# Patient Record
Sex: FEMALE | Race: WHITE | NOT HISPANIC OR LATINO | Employment: UNEMPLOYED | URBAN - METROPOLITAN AREA
[De-identification: names, ages, dates, MRNs, and addresses within clinical notes are randomized per-mention and may not be internally consistent; named-entity substitution may affect disease eponyms.]

---

## 2017-04-21 ENCOUNTER — HOSPITAL ENCOUNTER (OUTPATIENT)
Dept: NEUROLOGY | Facility: HOSPITAL | Age: 32
Discharge: HOME | End: 2017-04-21
Attending: PSYCHIATRY & NEUROLOGY
Payer: MEDICAID

## 2017-04-21 DIAGNOSIS — G40.319 GENERALIZED CONVULSIVE EPILEPSY WITH INTRACTABLE EPILEPSY: ICD-10-CM

## 2017-04-21 DIAGNOSIS — R56.9 CONVULSION DISORDER: ICD-10-CM

## 2017-04-21 PROCEDURE — 95816 EEG AWAKE AND DROWSY: CPT

## 2017-04-21 PROCEDURE — 95819 EEG AWAKE AND ASLEEP: CPT | Mod: 26,,, | Performed by: PSYCHIATRY & NEUROLOGY

## 2017-04-24 NOTE — PROCEDURES
DATE OF SERVICE:  04/21/2017    EEG NUMBER:  [id number].    REQUESTING PHYSICIAN:  Aga Atkinson M.D.    ELECTROENCEPHALOGRAM REPORT    METHODOLOGY:  Electroencephalographic (EEG) recording is recorded with   electrodes placed according to the International 10-20 placement system.  Thirty   two (32) channels of digital signal (sampling rate of 512/sec), including T1   and T2, were simultaneously recorded from the scalp and may include EKG, EMG,   and/or eye monitors.  Recording band pass was 0.1 to 512 Hz.  Digital video   recording of the patient is simultaneously recorded with the EEG.  The patient   is instructed to report clinical symptoms which may occur during the recording   session.  EEG and video recording are stored and archived in digital format.    Activation procedures, which include photic stimulation, hyperventilation and   instructing patients to perform simple tasks, are done in selected patients.    The EEG is displayed on a monitor screen and can be reviewed using different   montages.  Computer assisted-analysis is employed to detect spike and   electrographic seizure activity.  The entire record is submitted for computer   analysis.  The entire recording is visually reviewed, and the times identified   by computer analysis as being spikes or seizures are reviewed again.    Compressed spectral analysis (CSA) is also performed on the activity recorded   from each individual channel.  This is displayed as a power display of   frequencies from 0 to 30 Hz over time.  The CSA is reviewed looking for   asymmetries in power between homologous areas of the scalp, then compared with   the original EEG recording.    [employer] software was also utilized in the review of this study.  This software   suite analyzes the EEG recording in multiple domains.  Coherence and rhythmicity   are computed to identify EEG sections which may contain organized seizures.    Each channel undergoes analysis to detect the  presence of spike and sharp waves   which have special and morphological characteristics of epileptic activity.  The   routine EEG recording is converted from special into frequency domain.  This is   then displayed comparing homologous areas to identify areas of significant   asymmetry.  Algorithm to identify non-cortically generated artifact is used to   separate artifact from the EEG.    EEG FINDINGS:  Recording was obtained with the patient initially in a waking   state.  Background was a fairly rhythmic 11 Hz posterior dominant rhythm seen in   the occipital, parietal, and posterior temporal regions bilaterally.  There was   a prominent irregular beta activity seen diffusely.  The posterior dominant   rhythm attenuated intermittently and the patient appeared to become a little   drowsy.  A generalized low-voltage theta and beta mixture was noted within light   sleep for a short period of time.  No lateralized or focal changes were noted.    No spike or sharp wave activity was seen.  Intermittent photic stimulation was   carried out, which resulted in the occipital driving response without provoking   pathological discharges.  Three minutes of hyperventilation resulted in some   mild disorganization and slowing without provoking pathological activity.  The   slowing normalized quickly after overbreathing ceased.    IMPRESSION:  Normal waking and sleeping EEG.    CLINICAL CORRELATION:  The patient is a 31-year-old female who has a history of   3 seizures total.  First was 5 years ago and the most recently was in December 2016.  All have occurred with the discontinuation of medication.  This tracing   presently is normal and shows no evidence for an epileptic process.      LYNSEY/  dd: 04/24/2017 14:12:55 (CDT)  td: 04/24/2017 14:41:20 (CDT)  Doc ID   #[id number]  Job ID #[id number]    CC:

## 2017-05-27 ENCOUNTER — HOSPITAL ENCOUNTER (EMERGENCY)
Facility: HOSPITAL | Age: 32
Discharge: HOME | End: 2017-05-27
Attending: EMERGENCY MEDICINE
Payer: MEDICAID

## 2017-05-27 VITALS
HEIGHT: 63 IN | OXYGEN SATURATION: 96 % | TEMPERATURE: 99 F | WEIGHT: 170 LBS | HEART RATE: 103 BPM | DIASTOLIC BLOOD PRESSURE: 90 MMHG | SYSTOLIC BLOOD PRESSURE: 145 MMHG | BODY MASS INDEX: 30.12 KG/M2 | RESPIRATION RATE: 20 BRPM

## 2017-05-27 DIAGNOSIS — N75.0 BARTHOLIN'S CYST: Primary | ICD-10-CM

## 2017-05-27 PROCEDURE — 56420 I&D BARTHOLINS GLAND ABSCESS: CPT

## 2017-05-27 PROCEDURE — 25000003 PHARM REV CODE 250: Performed by: NURSE PRACTITIONER

## 2017-05-27 PROCEDURE — 99283 EMERGENCY DEPT VISIT LOW MDM: CPT | Mod: 25

## 2017-05-27 RX ORDER — LIDOCAINE HYDROCHLORIDE 10 MG/ML
5 INJECTION, SOLUTION EPIDURAL; INFILTRATION; INTRACAUDAL; PERINEURAL
Status: COMPLETED | OUTPATIENT
Start: 2017-05-27 | End: 2017-05-27

## 2017-05-27 RX ORDER — HYDROCODONE BITARTRATE AND ACETAMINOPHEN 5; 325 MG/1; MG/1
2 TABLET ORAL
Status: COMPLETED | OUTPATIENT
Start: 2017-05-27 | End: 2017-05-27

## 2017-05-27 RX ORDER — SULFAMETHOXAZOLE AND TRIMETHOPRIM 800; 160 MG/1; MG/1
1 TABLET ORAL 2 TIMES DAILY
Qty: 20 TABLET | Refills: 0 | Status: SHIPPED | OUTPATIENT
Start: 2017-05-27 | End: 2017-06-06

## 2017-05-27 RX ORDER — TRAMADOL HYDROCHLORIDE 50 MG/1
50 TABLET ORAL EVERY 6 HOURS PRN
Qty: 12 TABLET | Refills: 0 | Status: SHIPPED | OUTPATIENT
Start: 2017-05-27 | End: 2017-06-06

## 2017-05-27 RX ORDER — ESCITALOPRAM OXALATE 20 MG/1
20 TABLET ORAL DAILY
COMMUNITY

## 2017-05-27 RX ADMIN — LIDOCAINE HYDROCHLORIDE 50 MG: 10 INJECTION, SOLUTION EPIDURAL; INFILTRATION; INTRACAUDAL; PERINEURAL at 05:05

## 2017-05-27 RX ADMIN — HYDROCODONE BITARTRATE AND ACETAMINOPHEN 2 TABLET: 5; 325 TABLET ORAL at 05:05

## 2017-05-27 NOTE — ED PROVIDER NOTES
Encounter Date: 5/27/2017       History     Chief Complaint   Patient presents with    Bartholin's Cyst     no drainage, painful     Review of patient's allergies indicates:   Allergen Reactions    Penicillins Rash     PT REPORTS HAVING REACTION AS A CHILD     PT present to Ed for pain and swelling to vaginal area. PT reports she noted worsening over past few days. PT denies vaginal bleeding or dicharge. PT states she has had in past but usually resolved on its own. PT denies fever. Pt denies dysuria.           Past Medical History:   Diagnosis Date    Seizures      Past Surgical History:   Procedure Laterality Date    TONSILLECTOMY      TUBAL LIGATION      left fallopian removed     History reviewed. No pertinent family history.  Social History   Substance Use Topics    Smoking status: Current Some Day Smoker     Packs/day: 0.50     Types: Cigarettes    Smokeless tobacco: Not on file    Alcohol use Yes      Comment: OCCASIONALLY     Review of Systems   Constitutional: Negative.    Respiratory: Negative.    Cardiovascular: Negative.    Gastrointestinal: Negative.    Genitourinary: Positive for vaginal pain.        Swelling to R labia area   Musculoskeletal: Negative.    Neurological: Negative.    Hematological: Negative.    All other systems reviewed and are negative.      Physical Exam     Initial Vitals [05/27/17 1530]   BP Pulse Resp Temp SpO2   (!) 145/90 103 20 98.8 °F (37.1 °C) 96 %     Physical Exam    Nursing note and vitals reviewed.  Constitutional: She appears well-developed and well-nourished.   Neck: Normal range of motion.   Cardiovascular: Normal rate, regular rhythm, normal heart sounds and intact distal pulses.   Pulmonary/Chest: Breath sounds normal.   Abdominal: Soft. Bowel sounds are normal.   Genitourinary:   Genitourinary Comments: Bartholin cyst to R labia, tender on exam.    Musculoskeletal: Normal range of motion.   Neurological: She is alert and oriented to person, place, and time.    Skin: Skin is warm and dry.   Psychiatric: She has a normal mood and affect.         ED Course   I & D - Incision and Drainage  Date/Time: 5/27/2017 5:39 PM  Location procedure was performed: [facility] EMERGENCY DEPARTMENT  Performed by: ALICIA MEJÍA  Authorized by: JARAD RILEY JR   Assisting provider: JARAD RILEY JR  Pre-operative diagnosis: Abscess R labia  Post-operative diagnosis: Abscess R labia  Consent Done: Not Needed  Type: abscess  Body area: anogenital  Location details: Bartholin's gland  Anesthesia: local infiltration    Anesthesia:  Anesthesia: local infiltration  Local Anesthetic: lidocaine 1% without epinephrine   Anesthetic total: 3 mL  Patient sedated: no  Description of findings: R tenderness and swelling to R labia    Scalpel size: 10  Incision type: single straight  Complexity: simple  Drainage: bloody  Drainage amount: scant  Wound treatment: incision,  drainage and  wound packed  Packing material: 1/4 in gauze  Technical procedures used: incisin and drainaGE  Significant surgical tasks conducted by the assistant(s): none  Complications: No  Estimated blood loss (mL): 2  Specimens: No  Implants: No  Patient tolerance: Patient tolerated the procedure well with no immediate complications        Labs Reviewed - No data to display          Medical Decision Making:   Initial Assessment:   R labial abscess  Differential Diagnosis:   Abscess, Bartholin cyst,   ED Management:  R labia abscees incised and drained. Packed. Will follow up with Dr Childress on Tuesdat. Return to Ed for worsening. Bactrim and Tramadol for pain                    ED Course     Clinical Impression:   The encounter diagnosis was Bartholin's cyst.    Disposition:   Disposition: Discharged  Condition: Stable       Alicia Mejía NP  05/27/17 1748

## 2017-05-27 NOTE — ED NOTES
31 year old female presents to ed with chief complaint of bartholin's cyst that began two days ago. Patient states hx of bartholin cyst. Denies drainage states home remedies i.e. Warm bathes and balms used. Patient denies vaginal discharge/ bledding

## 2017-05-28 ENCOUNTER — HOSPITAL ENCOUNTER (EMERGENCY)
Facility: HOSPITAL | Age: 32
Discharge: HOME | End: 2017-05-28
Attending: EMERGENCY MEDICINE
Payer: MEDICAID

## 2017-05-28 VITALS
DIASTOLIC BLOOD PRESSURE: 81 MMHG | HEART RATE: 103 BPM | HEIGHT: 63 IN | RESPIRATION RATE: 20 BRPM | WEIGHT: 170 LBS | TEMPERATURE: 99 F | SYSTOLIC BLOOD PRESSURE: 138 MMHG | BODY MASS INDEX: 30.12 KG/M2 | OXYGEN SATURATION: 98 %

## 2017-05-28 DIAGNOSIS — N75.0 BARTHOLIN CYST: ICD-10-CM

## 2017-05-28 DIAGNOSIS — Z51.89 WOUND CHECK, ABSCESS: Primary | ICD-10-CM

## 2017-05-28 PROCEDURE — 99283 EMERGENCY DEPT VISIT LOW MDM: CPT | Mod: 25

## 2017-05-28 PROCEDURE — 25000003 PHARM REV CODE 250: Performed by: PHYSICIAN ASSISTANT

## 2017-05-28 PROCEDURE — 96372 THER/PROPH/DIAG INJ SC/IM: CPT

## 2017-05-28 PROCEDURE — 63600175 PHARM REV CODE 636 W HCPCS: Performed by: PHYSICIAN ASSISTANT

## 2017-05-28 RX ORDER — HYDROMORPHONE HYDROCHLORIDE 1 MG/ML
0.5 INJECTION, SOLUTION INTRAMUSCULAR; INTRAVENOUS; SUBCUTANEOUS
Status: COMPLETED | OUTPATIENT
Start: 2017-05-28 | End: 2017-05-28

## 2017-05-28 RX ORDER — OXYCODONE AND ACETAMINOPHEN 5; 325 MG/1; MG/1
1 TABLET ORAL EVERY 4 HOURS PRN
Qty: 16 TABLET | Refills: 0 | Status: SHIPPED | OUTPATIENT
Start: 2017-05-28

## 2017-05-28 RX ORDER — ONDANSETRON 4 MG/1
4 TABLET, ORALLY DISINTEGRATING ORAL
Status: COMPLETED | OUTPATIENT
Start: 2017-05-28 | End: 2017-05-28

## 2017-05-28 RX ADMIN — ONDANSETRON 4 MG: 4 TABLET, ORALLY DISINTEGRATING ORAL at 12:05

## 2017-05-28 RX ADMIN — HYDROMORPHONE HYDROCHLORIDE: 1 INJECTION, SOLUTION INTRAMUSCULAR; INTRAVENOUS; SUBCUTANEOUS at 12:05

## 2017-05-28 NOTE — ED NOTES
Bartholin cyst x 3 days, was seen in ED last pm with I & D to area performed.  PT states pain/swelling to area has increased since procedure and pain medication has been ineffective.  Denies fever.  Last dose of tramadol was 6 am.

## 2017-05-28 NOTE — ED PROVIDER NOTES
Encounter Date: 5/28/2017       History     Chief Complaint   Patient presents with    Bartholin's Cyst     was seen here yesterday and had a I&D; cyst is larger and more painful today with no drainage     Review of patient's allergies indicates:   Allergen Reactions    Penicillins Rash     PT REPORTS HAVING REACTION AS A CHILD     Rosalind Willis, a 31 y.o. female with PMH of convulsion disorder that presents to the ED for recheck of her Bartholin cyst that was I&D'ed yesterday at this facility.  She has experienced increased pain and swelling to this area but no discharge.  Only gauze was placed at the site with no packing or word catheter.  She has started her ABX and has taken medication for pain, but states that she's gotten very little pain relief with what was prescribed.  She denies any fever, N/V.          The history is provided by the patient.     Past Medical History:   Diagnosis Date    Bartholin cyst     Seizures      Past Surgical History:   Procedure Laterality Date    TONSILLECTOMY      TUBAL LIGATION      left fallopian removed     History reviewed. No pertinent family history.  Social History   Substance Use Topics    Smoking status: Current Some Day Smoker     Packs/day: 0.50     Types: Cigarettes    Smokeless tobacco: Not on file    Alcohol use Yes      Comment: OCCASIONALLY     Review of Systems   Constitutional: Negative for fever.   Respiratory: Negative for shortness of breath.    Gastrointestinal: Negative for nausea and vomiting.   Genitourinary: Positive for vaginal pain (to R labia ). Negative for vaginal bleeding and vaginal discharge.   Skin: Positive for wound.   Allergic/Immunologic: Negative for immunocompromised state.   Neurological: Negative for light-headedness.   Hematological: Does not bruise/bleed easily.   Psychiatric/Behavioral: Negative for agitation.   All other systems reviewed and are negative.      Physical Exam     Initial Vitals [05/28/17 1203]   BP Pulse  Resp Temp SpO2   (!) 144/79 98 20 98.3 °F (36.8 °C) 98 %     Physical Exam    Nursing note and vitals reviewed.  Constitutional: She appears well-developed and well-nourished. No distress.   HENT:   Head: Normocephalic and atraumatic.   Right Ear: External ear normal.   Left Ear: External ear normal.   Nose: Nose normal.   Mouth/Throat: Oropharynx is clear and moist.   Eyes: Conjunctivae and EOM are normal.   Neck: Normal range of motion.   Cardiovascular: Normal rate and regular rhythm.   Pulmonary/Chest: Breath sounds normal. No respiratory distress.   Genitourinary: Pelvic exam was performed with patient supine. There is tenderness (with induration.  No increased erythema, drainage or fluctuance.  ) on the right labia.   Genitourinary Comments: Examined with Dr. Fernando    Musculoskeletal: Normal range of motion. She exhibits no tenderness.   Neurological: She is alert and oriented to person, place, and time. She has normal strength.   Skin: Skin is warm and dry. No rash noted. No erythema.   Psychiatric: She has a normal mood and affect. Thought content normal.         ED Course   Procedures  Labs Reviewed - No data to display          Medical Decision Making:   Initial Assessment:   Increased pain and swelling to R labia  Differential Diagnosis:   Bartholin cyst, cellulitis, folliculitis   ED Management:  Patient appears non-toxic, afebrile in mild distress, especially with examination of R labia.  R labia is indurated, but no fluctuance noted, therefore I feel that re-incision of this area would no provide much improvement.  Dilaudid and zofran given for pain control of this site.  Patient was given information on symptomatic control and instructed to f/u with this facility on Tuesday for re-evaluation of this site.  She will be prescribed a stronger narcotic for pain control and instructed to continue her previously prescribed ABX.  Strict return precautions given and patient verbalized understanding.    RX:  percocet (given with driving precautions.)   Other:   I discussed test(s) with the performing physician.                   ED Course     Clinical Impression:   The primary encounter diagnosis was Wound check, abscess. A diagnosis of Bartholin cyst was also pertinent to this visit.          Brianda Muñoz PA-C  05/28/17 1419

## 2017-05-30 ENCOUNTER — HOSPITAL ENCOUNTER (EMERGENCY)
Facility: HOSPITAL | Age: 32
Discharge: HOME | End: 2017-05-30
Attending: EMERGENCY MEDICINE
Payer: MEDICAID

## 2017-05-30 VITALS
HEIGHT: 63 IN | HEART RATE: 71 BPM | OXYGEN SATURATION: 98 % | WEIGHT: 170 LBS | RESPIRATION RATE: 16 BRPM | DIASTOLIC BLOOD PRESSURE: 72 MMHG | TEMPERATURE: 98 F | SYSTOLIC BLOOD PRESSURE: 118 MMHG | BODY MASS INDEX: 30.12 KG/M2

## 2017-05-30 DIAGNOSIS — N75.0 BARTHOLIN CYST: Primary | ICD-10-CM

## 2017-05-30 PROCEDURE — 99283 EMERGENCY DEPT VISIT LOW MDM: CPT

## 2017-05-30 NOTE — ED TRIAGE NOTES
pt was seen here for a bartholin cyst on Saturday and Sunday. Denies improvement in pain, pt states she is taking meds

## 2017-05-30 NOTE — ED PROVIDER NOTES
Encounter Date: 5/30/2017       History     Chief Complaint   Patient presents with    Abscess     pt was seen here for a bartholin cyst on Saturday and Sunday. Denies improvement in pain     Review of patient's allergies indicates:   Allergen Reactions    Penicillins Rash     PT REPORTS HAVING REACTION AS A CHILD     Rosalind Willis, a 31 y.o. female that presents to the ED for reevaluation of bartholin cyst that was I&D'ed on 05/27/17.  She states that she's had some minor blood drainage to the site with the sensation of the same amount of swelling and pain.  She denies any fever, increased redness.  She has been taking her bactrim as prescribed as well as using warm compresses to site.        The history is provided by the patient.     Past Medical History:   Diagnosis Date    Bartholin cyst     Seizures      Past Surgical History:   Procedure Laterality Date    TONSILLECTOMY      TUBAL LIGATION      left fallopian removed     No family history on file.  Social History   Substance Use Topics    Smoking status: Current Some Day Smoker     Packs/day: 0.50     Types: Cigarettes    Smokeless tobacco: Not on file    Alcohol use Yes      Comment: OCCASIONALLY     Review of Systems   Constitutional: Negative for fever.   Gastrointestinal: Negative for nausea and vomiting.   Genitourinary: Positive for vaginal pain. Negative for dysuria, vaginal bleeding and vaginal discharge.   Musculoskeletal: Negative for arthralgias.   Skin: Negative for color change and rash.   Allergic/Immunologic: Negative for immunocompromised state.   Neurological: Negative for dizziness.   Psychiatric/Behavioral: Negative for agitation and confusion.   All other systems reviewed and are negative.      Physical Exam     Initial Vitals [05/30/17 1651]   BP Pulse Resp Temp SpO2   133/78 85 18 98 °F (36.7 °C) 97 %     Physical Exam    Nursing note and vitals reviewed.  Constitutional: She appears well-developed and well-nourished. No  distress.   HENT:   Head: Normocephalic and atraumatic.   Right Ear: External ear normal.   Left Ear: External ear normal.   Nose: Nose normal.   Mouth/Throat: Oropharynx is clear and moist.   Eyes: Conjunctivae and EOM are normal.   Neck: Normal range of motion.   Cardiovascular: Normal rate and regular rhythm.   Pulmonary/Chest: Breath sounds normal. No respiratory distress.   Genitourinary: Pelvic exam was performed with patient supine. There is tenderness on the right labia.   Genitourinary Comments: Tenderness with induration noted to R labia majora with area of ulceration to labia minor.  No fluctuance noted.  Bedside US showed no evidence of fluid collection.     Musculoskeletal: Normal range of motion. She exhibits no tenderness.   Neurological: She is alert and oriented to person, place, and time. She has normal strength.   Skin: Skin is warm and dry. No rash noted. No erythema.   Psychiatric: She has a normal mood and affect. Thought content normal.         ED Course   Procedures  Labs Reviewed - No data to display          Medical Decision Making:   Initial Assessment:   Re-evaluation of bartholin cyst   Differential Diagnosis:   Bartholin cyst, cellulitis, folliculitis   ED Management:  Patient presents to ED non-toxic, afebrile and in NAD.  R labia is indurated, but no fluctuance noted on exam or on beside US. Therefore I feel that re-incision of this area would no provide much improvement.  Patient was given information on symptomatic control, instructed to continue with course of ABX and to f/u with OB/GYN for further evaluation of this site.  Strict return precautions given and patient verbalized understanding.    Other:   I discussed test(s) with the performing physician.                   ED Course     Clinical Impression:   The encounter diagnosis was Bartholin cyst.          Brianda Muñoz PA-C  05/30/17 4152

## 2017-09-28 ENCOUNTER — HOSPITAL ENCOUNTER (EMERGENCY)
Facility: HOSPITAL | Age: 32
Discharge: HOME | End: 2017-09-28
Attending: EMERGENCY MEDICINE
Payer: MEDICAID

## 2017-09-28 VITALS
DIASTOLIC BLOOD PRESSURE: 99 MMHG | OXYGEN SATURATION: 99 % | WEIGHT: 175 LBS | SYSTOLIC BLOOD PRESSURE: 139 MMHG | RESPIRATION RATE: 16 BRPM | TEMPERATURE: 98 F | HEIGHT: 63 IN | BODY MASS INDEX: 31.01 KG/M2 | HEART RATE: 80 BPM

## 2017-09-28 DIAGNOSIS — R10.31 RIGHT INGUINAL PAIN: Primary | ICD-10-CM

## 2017-09-28 LAB
B-HCG UR QL: NEGATIVE
CTP QC/QA: YES

## 2017-09-28 PROCEDURE — 81025 URINE PREGNANCY TEST: CPT | Performed by: NURSE PRACTITIONER

## 2017-09-28 PROCEDURE — 99283 EMERGENCY DEPT VISIT LOW MDM: CPT

## 2017-09-28 RX ORDER — FOLIC ACID 0.4 MG
400 TABLET ORAL DAILY
COMMUNITY

## 2017-09-28 NOTE — ED NOTES
Pt c/o R groin pain since yesterday, that is worse with movement and palpation.  Pt denies injury.  Pt states she recently cleaned her grandmother's house. Pt denies abd pain.  Pt states she was concerned d/t having ectopic pregnancy in past.

## 2017-09-28 NOTE — ED PROVIDER NOTES
"Encounter Date: 2017       History     Chief Complaint   Patient presents with    Female  Problem     Right groin pain with white discharge with pink since yesterday.     32-year-old female  presents to the ED for evaluation of right groin pain started this morning.  She has also noticed some white vaginal discharge with "a little pink" since yesterday.  The patient does have a history of ectopic pregnancy on the left side.  She is concerned that she may be pregnant and have another ectopic pregnancy.  She denies trauma, fever/chills, vomiting, dysuria.  She reports the pain is worse with movement.  Rest seems to help the pain.  LMP 2017.  She reports that her periods are irregular. S/p left ectopic pregnancy.        The history is provided by the patient.   Female  Problem   Primary symptoms include discharge.    Primary symptoms include no pelvic pain, no fever, no dysuria, and no vaginal bleeding.   Primary symptoms comment: right inguinal pain. This is a new problem. The current episode started yesterday. The problem occurs intermittently. The problem has been waxing and waning. The symptoms occur spontaneously. She is not pregnant. Pregnancies:  - 2, Para - 0, Abortions (including miscarriages) - 2. Her LMP was weeks ago. The patient's menstrual history has been irregular. The discharge was white (pink spots\). Pertinent negatives include no anorexia, no diaphoresis, no abdominal swelling, no abdominal pain, no constipation, no diarrhea, no nausea, no vomiting, no frequency, no light-headedness and no dizziness. She has tried nothing for the symptoms. The treatment provided no relief. Sexual activity: sexually active. There is no concern regarding sexually transmitted diseases. She uses nothing for contraception. Associated medical issues include gynecological surgery and miscarriage.     Review of patient's allergies indicates:   Allergen Reactions    Penicillins Rash     PT REPORTS " HAVING REACTION AS A CHILD     Past Medical History:   Diagnosis Date    Bartholin cyst     Seizures      Past Surgical History:   Procedure Laterality Date    TONSILLECTOMY      TUBAL LIGATION      left fallopian removed     History reviewed. No pertinent family history.  Social History   Substance Use Topics    Smoking status: Former Smoker     Packs/day: 0.50     Types: Cigarettes    Smokeless tobacco: Never Used    Alcohol use Yes      Comment: OCCASIONALLY     Review of Systems   Constitutional: Negative for chills, diaphoresis and fever.   HENT: Negative for congestion.    Respiratory: Negative for cough and shortness of breath.    Cardiovascular: Negative for chest pain.   Gastrointestinal: Negative for abdominal pain, anorexia, constipation, diarrhea, nausea and vomiting.   Genitourinary: Positive for vaginal discharge. Negative for dysuria, frequency, pelvic pain and vaginal bleeding.   Musculoskeletal: Negative for back pain and gait problem.   Skin: Negative for rash.   Neurological: Negative for dizziness and light-headedness.   Psychiatric/Behavioral: Negative for confusion.       Physical Exam     Initial Vitals [09/28/17 1141]   BP Pulse Resp Temp SpO2   (!) 139/99 80 16 98.4 °F (36.9 °C) 99 %      MAP       112.33         Physical Exam    Nursing note and vitals reviewed.  Constitutional: Vital signs are normal. She appears well-developed and well-nourished. She is active and cooperative. She is easily aroused.  Non-toxic appearance. She does not have a sickly appearance. She does not appear ill. No distress.   HENT:   Head: Normocephalic and atraumatic.   Eyes: Conjunctivae and EOM are normal.   Neck: Normal range of motion. Neck supple.   Cardiovascular: Normal rate, regular rhythm and normal heart sounds.   Pulmonary/Chest: Effort normal and breath sounds normal.   Abdominal: Soft. Normal appearance and bowel sounds are normal. She exhibits no distension. There is no tenderness. There is no  rigidity, no rebound, no guarding and no CVA tenderness. Hernia confirmed negative in the right inguinal area and confirmed negative in the left inguinal area.   Genitourinary: Vagina normal and uterus normal. Pelvic exam was performed with patient supine. No labial fusion. There is no rash, tenderness, lesion or injury on the right labia. There is no rash, tenderness, lesion or injury on the left labia. Cervix exhibits no motion tenderness, no discharge and no friability. Right adnexum displays no mass, no tenderness and no fullness. Left adnexum displays no mass, no tenderness and no fullness.   Genitourinary Comments: Very minimal white discharge, which appears to be normal.  No bleeding.     Musculoskeletal:        Right hip: She exhibits normal range of motion, normal strength, no tenderness, no bony tenderness, no swelling, no crepitus, no deformity and no laceration.        Lumbar back: Normal.        Right upper leg: Normal.        Legs:  Lymphadenopathy:        Right: No inguinal adenopathy present.        Left: No inguinal adenopathy present.   Neurological: She is alert, oriented to person, place, and time and easily aroused. She has normal strength. No sensory deficit. GCS eye subscore is 4. GCS verbal subscore is 5. GCS motor subscore is 6.   Pt walks with steady gait.    Skin: Skin is warm, dry and intact. No abrasion, no bruising and no rash noted. No erythema.   Psychiatric: She has a normal mood and affect. Her speech is normal and behavior is normal. Judgment and thought content normal. Cognition and memory are normal.         ED Course   Procedures  Labs Reviewed   POCT URINE PREGNANCY             Medical Decision Making:   Initial Assessment:   32-year-old female here for right inguinal pain since yesterday.  She reports occasional white vaginal discharge with occasional pink spots.  She has a history of an ectopic pregnancy, and is concerned that she may be pregnant at this time.  No trauma,  fever/chills, abdominal pain, nausea/vomiting/diarrhea, or dysuria.  Patient appears well, nontoxic.  Abdomen soft, nontender.  Pelvic exam normal.  There is very minimal white discharge upon exam without bleeding.  No adnexal tenderness, mass, or fullness.  Site of the patient's pain is the right inguinal area.  No rash, signs of trauma, or hernia.  Full active range of motion without pain to bilateral hips.  Differential Diagnosis:   Pregnancy, strain, sprain  Clinical Tests:   Lab Tests: Reviewed and Ordered  ED Management:  UPT, pelvic exam  UPT negative.  The patient's pain is not located in her pelvis.  The patient's pain is in the right inguinal area.  There is no indication for imaging or labs at this time.  Unclear etiology of patient's pain and she denies recent strenuous activity.  I advised follow-up with her PCP within 2-3 days, and return to the ER if condition changes, progresses, or if there are any concerns.  Patient verbalized understanding, compliance, and agreement with the treatment plan.                   ED Course      Clinical Impression:   The encounter diagnosis was Right inguinal pain.                           Miley Burnett, EMEKA  09/28/17 1818

## 2018-01-08 ENCOUNTER — HOSPITAL ENCOUNTER (EMERGENCY)
Facility: HOSPITAL | Age: 33
Discharge: HOME | End: 2018-01-08
Attending: EMERGENCY MEDICINE
Payer: MEDICAID

## 2018-01-08 VITALS
BODY MASS INDEX: 31.01 KG/M2 | OXYGEN SATURATION: 97 % | DIASTOLIC BLOOD PRESSURE: 83 MMHG | WEIGHT: 175 LBS | HEART RATE: 81 BPM | SYSTOLIC BLOOD PRESSURE: 136 MMHG | RESPIRATION RATE: 16 BRPM | HEIGHT: 63 IN | TEMPERATURE: 98 F

## 2018-01-08 DIAGNOSIS — T78.40XA ALLERGIC REACTION, INITIAL ENCOUNTER: Primary | ICD-10-CM

## 2018-01-08 PROCEDURE — 99284 EMERGENCY DEPT VISIT MOD MDM: CPT | Mod: 25

## 2018-01-08 PROCEDURE — S0028 INJECTION, FAMOTIDINE, 20 MG: HCPCS | Performed by: EMERGENCY MEDICINE

## 2018-01-08 PROCEDURE — 96375 TX/PRO/DX INJ NEW DRUG ADDON: CPT

## 2018-01-08 PROCEDURE — 25000003 PHARM REV CODE 250: Performed by: EMERGENCY MEDICINE

## 2018-01-08 PROCEDURE — 63600175 PHARM REV CODE 636 W HCPCS: Performed by: EMERGENCY MEDICINE

## 2018-01-08 PROCEDURE — 96374 THER/PROPH/DIAG INJ IV PUSH: CPT

## 2018-01-08 RX ORDER — FAMOTIDINE 10 MG/ML
20 INJECTION INTRAVENOUS 2 TIMES DAILY
Status: DISCONTINUED | OUTPATIENT
Start: 2018-01-08 | End: 2018-01-08 | Stop reason: HOSPADM

## 2018-01-08 RX ORDER — METHYLPREDNISOLONE SOD SUCC 125 MG
125 VIAL (EA) INJECTION
Status: COMPLETED | OUTPATIENT
Start: 2018-01-08 | End: 2018-01-08

## 2018-01-08 RX ORDER — DIPHENHYDRAMINE HYDROCHLORIDE 50 MG/ML
12.5 INJECTION INTRAMUSCULAR; INTRAVENOUS
Status: COMPLETED | OUTPATIENT
Start: 2018-01-08 | End: 2018-01-08

## 2018-01-08 RX ORDER — FAMOTIDINE 20 MG/1
20 TABLET, FILM COATED ORAL 2 TIMES DAILY
Qty: 10 TABLET | Refills: 0 | Status: SHIPPED | OUTPATIENT
Start: 2018-01-08 | End: 2018-01-13

## 2018-01-08 RX ORDER — PREDNISONE 20 MG/1
40 TABLET ORAL DAILY
Qty: 10 TABLET | Refills: 0 | Status: SHIPPED | OUTPATIENT
Start: 2018-01-08 | End: 2018-01-13

## 2018-01-08 RX ORDER — HYDROXYZINE PAMOATE 25 MG/1
25 CAPSULE ORAL EVERY 6 HOURS PRN
Qty: 30 CAPSULE | Refills: 0 | Status: SHIPPED | OUTPATIENT
Start: 2018-01-08

## 2018-01-08 RX ADMIN — METHYLPREDNISOLONE SODIUM SUCCINATE 125 MG: 125 INJECTION, POWDER, FOR SOLUTION INTRAMUSCULAR; INTRAVENOUS at 08:01

## 2018-01-08 RX ADMIN — DIPHENHYDRAMINE HYDROCHLORIDE 12.5 MG: 50 INJECTION, SOLUTION INTRAMUSCULAR; INTRAVENOUS at 08:01

## 2018-01-08 RX ADMIN — FAMOTIDINE 20 MG: 10 INJECTION, SOLUTION INTRAVENOUS at 08:01

## 2018-01-08 NOTE — DISCHARGE INSTRUCTIONS
Take your medications as prescribed.  Follow up with ophthalmology for recheck within 2 days.  Follow up with your primary care physician if you're not improving in 2-3 days.  Return to the emergency department if shortness of breath, difficulty breathing, difficulty swallowing or any other concerns.  Please refer to the additional material providing further information including when return to the emergency department.

## 2018-01-08 NOTE — ED PROVIDER NOTES
Encounter Date: 1/8/2018    SCRIBE #1 NOTE: I, Marlin Mcclure, am scribing for, and in the presence of,  Dr. Panda. I have scribed the entire note.     I, Dr. Inga Panda MD, personally performed the services described in this documentation. All medical record entries made by the scribe were at my direction and in my presence.  I have reviewed the chart and agree that the record reflects my personal performance and is accurate and complete. Inga Panda MD.  10:39 AM 01/08/2018    History     Chief Complaint   Patient presents with    Eye Problem     reports swollen eyes since this morning.  has an allergy to cats and owns a cat at home. Reports blurred vision, currently wearing glasses.      CHIEF COMPLAINT: Patient presents with:  Eye Problem: reports swollen eyes since this morning.  has an allergy to cats and owns a cat at home. Reports blurred vision, currently wearing glasses.       HISTORY OF PRESENT ILLNESS: Rosalind Willis who is a 32 y.o. presents to the emergency department today with complaint of swollen eyes that began yesterday morning. Onset is sudden. Symptom began with itching to the eyes and after rubbing on them, her eyes began to swell up. Pt states she is allergic to cats and she owns a cat at home. She states that she usually take benadryl or claritin to control her allergies. The last time she takes benadryl was 3 days ago and has not taken Claritin for a while. She denies any visual changes. She denies any difficulty breathing or swallowing.           ALLERGIES REVIEWED  MEDICATIONS REVIEWED  PMH/PSH/SOC/FH REVIEWED     The history is provided by the patient.    Nursing/Ancillary staff note reviewed.          The history is provided by the patient.     Review of patient's allergies indicates:   Allergen Reactions    Penicillins Rash     PT REPORTS HAVING REACTION AS A CHILD     Past Medical History:   Diagnosis Date    Bartholin cyst     Seizures      Past Surgical  "History:   Procedure Laterality Date    TONSILLECTOMY      TUBAL LIGATION      left fallopian removed     No family history on file.  Social History   Substance Use Topics    Smoking status: Former Smoker     Packs/day: 0.50     Types: Cigarettes    Smokeless tobacco: Never Used    Alcohol use Yes      Comment: OCCASIONALLY     Review of Systems   Constitutional: Negative for activity change, chills, diaphoresis and fever.   HENT: Negative for congestion, ear discharge, facial swelling, rhinorrhea, sinus pain, sore throat, trouble swallowing and voice change.    Eyes: Positive for pain and itching. Negative for photophobia, discharge, redness and visual disturbance.   Respiratory: Negative for cough, chest tightness, shortness of breath and wheezing.    Cardiovascular: Negative for chest pain, palpitations and leg swelling.   Gastrointestinal: Negative for abdominal pain, constipation, diarrhea and vomiting.   Genitourinary: Negative for flank pain, frequency and urgency.   Musculoskeletal: Negative for arthralgias, joint swelling, neck pain and neck stiffness.   Skin: Negative for color change and pallor.   Neurological: Negative for dizziness, weakness, light-headedness and headaches.   All other systems reviewed and are negative.      Physical Exam     Initial Vitals [01/08/18 0658]   BP Pulse Resp Temp SpO2   (!) 161/83 81 12 98.1 °F (36.7 °C) 97 %      MAP       109           /83 (BP Location: Right arm, Patient Position: Sitting)   Pulse 81   Temp 98.3 °F (36.8 °C) (Oral)   Resp 16   Ht 5' 3" (1.6 m)   Wt 79.4 kg (175 lb)   SpO2 97%   BMI 31.00 kg/m²     Physical Exam    Nursing note and vitals reviewed.  Constitutional: She appears well-developed and well-nourished. She is not diaphoretic. No distress.   HENT:   Head: Normocephalic and atraumatic.   Right Ear: External ear normal.   Left Ear: External ear normal.   Nose: Nose normal.   Mouth/Throat: Oropharynx is clear and moist. No " oropharyngeal exudate.   Eyes: Conjunctivae and EOM are normal. Pupils are equal, round, and reactive to light. Right eye exhibits no discharge. Left eye exhibits no discharge.   There is significant amount of edema to the lower lids.   Neck: Normal range of motion. Neck supple.   Cardiovascular: Normal rate, regular rhythm, normal heart sounds and intact distal pulses. Exam reveals no gallop and no friction rub.    No murmur heard.  Pulmonary/Chest: Breath sounds normal. No stridor. No respiratory distress. She has no wheezes. She has no rales. She exhibits no tenderness.   Abdominal: Soft. Bowel sounds are normal. She exhibits no distension and no mass. There is no tenderness. There is no rebound and no guarding.   Musculoskeletal: Normal range of motion. She exhibits no edema or tenderness.   Lymphadenopathy:     She has no cervical adenopathy.   Neurological: She is alert and oriented to person, place, and time. She has normal strength. No cranial nerve deficit.   Skin: Skin is warm and dry. Capillary refill takes less than 2 seconds. No rash noted. No erythema. No pallor.   Psychiatric: She has a normal mood and affect. Thought content normal.         ED Course   Procedures  Labs Reviewed - No data to display          Medical Decision Making:   History:   Old Medical Records: I decided to obtain old medical records.  Differential Diagnosis:   Allergic reaction.  ED Management:  32 y.o. Female presents to the ED what appears to be an allergic reaction postibly to her cat, possibly to a new eye cream. Will place order for steroid, H1 and H2 blocker. Will have her follow up with her PCP or optometrist allergic. After taking into careful account the historical factors and physical exam findings of the patient's presentation today, in conjunction with the empirical and objective data obtained on ED workup, no acute emergent medical condition has been identified. The patient appears to be low risk for an emergent  medical condition and I feel it is safe and appropriate at this time for the patient to be discharged to follow-up as detailed in their discharge instructions for reevaluation and possible continued outpatient workup and management. I have discussed the specifics of the workup with the patient and the patient has verbalized understanding of the details of the workup, the diagnosis, the treatment plan, and the need for outpatient follow-up.  Although the patient has no emergent etiology today this does not preclude the development of an emergent condition so in addition, I have advised the patient that they can return to the ED and/or activate EMS at any time with worsening of their symptoms, change of their symptoms, or with any other medical complaint.  The patient remained comfortable and stable during their visit in the ED.  Discharge and follow-up instructions discussed with the patient who expressed understanding and willingness to comply with my recommendations.                   ED Course    .Patient improved with treatment in the emergency department and comfortable going home. Discussed reasons to return and importance of followup.  Patient understands that the emergency visit today is primarily to address immediate concerns and to rule out emergent cause of symptoms and that they may require further workup and evaluation as an outpatient. All questions addressed and patient given discharge instructions and followup information.     Clinical Impression:   The encounter diagnosis was Allergic reaction, initial encounter.    Disposition:   Disposition: Discharged  Condition: Stable                        Inga Panda MD  02/02/18 8894

## 2018-01-08 NOTE — ED TRIAGE NOTES
Pt states yesterday eyes began to itch, she rubbed one, and then this morning both eyes were swollen, red, and itching. Noted both eyes red and swollen, no visible drainage. Pt reports blurry vision. Denies injury at this time.

## 2019-03-11 ENCOUNTER — HOSPITAL ENCOUNTER (EMERGENCY)
Facility: HOSPITAL | Age: 34
Discharge: HOME | End: 2019-03-11
Attending: EMERGENCY MEDICINE
Payer: MEDICAID

## 2019-03-11 VITALS
SYSTOLIC BLOOD PRESSURE: 122 MMHG | TEMPERATURE: 98 F | BODY MASS INDEX: 31.89 KG/M2 | WEIGHT: 180 LBS | HEART RATE: 76 BPM | OXYGEN SATURATION: 99 % | HEIGHT: 63 IN | DIASTOLIC BLOOD PRESSURE: 70 MMHG | RESPIRATION RATE: 18 BRPM

## 2019-03-11 DIAGNOSIS — O21.9 NAUSEA/VOMITING IN PREGNANCY: Primary | ICD-10-CM

## 2019-03-11 DIAGNOSIS — O30.001 TWIN GESTATION IN FIRST TRIMESTER, UNSPECIFIED MULTIPLE GESTATION TYPE: ICD-10-CM

## 2019-03-11 LAB
ALBUMIN SERPL BCP-MCNC: 4.3 G/DL
ALP SERPL-CCNC: 74 U/L
ALT SERPL W/O P-5'-P-CCNC: 13 U/L
ANION GAP SERPL CALC-SCNC: 9 MMOL/L
AST SERPL-CCNC: 14 U/L
BASOPHILS # BLD AUTO: 0.03 K/UL
BASOPHILS NFR BLD: 0.4 %
BILIRUB SERPL-MCNC: 0.3 MG/DL
BILIRUB UR QL STRIP: NEGATIVE
BUN SERPL-MCNC: 9 MG/DL
CALCIUM SERPL-MCNC: 9.7 MG/DL
CHLORIDE SERPL-SCNC: 104 MMOL/L
CLARITY UR: CLEAR
CO2 SERPL-SCNC: 23 MMOL/L
COLOR UR: YELLOW
CREAT SERPL-MCNC: 0.7 MG/DL
DIFFERENTIAL METHOD: NORMAL
EOSINOPHIL # BLD AUTO: 0.1 K/UL
EOSINOPHIL NFR BLD: 1.4 %
ERYTHROCYTE [DISTWIDTH] IN BLOOD BY AUTOMATED COUNT: 12.8 %
EST. GFR  (AFRICAN AMERICAN): >60 ML/MIN/1.73 M^2
EST. GFR  (NON AFRICAN AMERICAN): >60 ML/MIN/1.73 M^2
GLUCOSE SERPL-MCNC: 123 MG/DL
GLUCOSE UR QL STRIP: NEGATIVE
HCG INTACT+B SERPL-ACNC: NORMAL MIU/ML
HCT VFR BLD AUTO: 40 %
HGB BLD-MCNC: 13.5 G/DL
HGB UR QL STRIP: ABNORMAL
INFLUENZA A, MOLECULAR: NEGATIVE
INFLUENZA B, MOLECULAR: NEGATIVE
KETONES UR QL STRIP: ABNORMAL
LEUKOCYTE ESTERASE UR QL STRIP: NEGATIVE
LIPASE SERPL-CCNC: 7 U/L
LYMPHOCYTES # BLD AUTO: 1.5 K/UL
LYMPHOCYTES NFR BLD: 20.2 %
MCH RBC QN AUTO: 29.9 PG
MCHC RBC AUTO-ENTMCNC: 33.8 G/DL
MCV RBC AUTO: 89 FL
MONOCYTES # BLD AUTO: 0.7 K/UL
MONOCYTES NFR BLD: 8.7 %
NEUTROPHILS # BLD AUTO: 5.3 K/UL
NEUTROPHILS NFR BLD: 69 %
NITRITE UR QL STRIP: NEGATIVE
PH UR STRIP: 7 [PH] (ref 5–8)
PLATELET # BLD AUTO: 231 K/UL
PMV BLD AUTO: 9.9 FL
POTASSIUM SERPL-SCNC: 3.5 MMOL/L
PROT SERPL-MCNC: 8 G/DL
PROT UR QL STRIP: NEGATIVE
RBC # BLD AUTO: 4.52 M/UL
SODIUM SERPL-SCNC: 136 MMOL/L
SP GR UR STRIP: 1.02 (ref 1–1.03)
SPECIMEN SOURCE: NORMAL
URN SPEC COLLECT METH UR: ABNORMAL
UROBILINOGEN UR STRIP-ACNC: NEGATIVE EU/DL
WBC # BLD AUTO: 7.63 K/UL

## 2019-03-11 PROCEDURE — 85025 COMPLETE CBC W/AUTO DIFF WBC: CPT

## 2019-03-11 PROCEDURE — 96375 TX/PRO/DX INJ NEW DRUG ADDON: CPT

## 2019-03-11 PROCEDURE — 84702 CHORIONIC GONADOTROPIN TEST: CPT

## 2019-03-11 PROCEDURE — 96361 HYDRATE IV INFUSION ADD-ON: CPT

## 2019-03-11 PROCEDURE — 83690 ASSAY OF LIPASE: CPT

## 2019-03-11 PROCEDURE — 25000003 PHARM REV CODE 250: Performed by: PHYSICIAN ASSISTANT

## 2019-03-11 PROCEDURE — 81003 URINALYSIS AUTO W/O SCOPE: CPT

## 2019-03-11 PROCEDURE — 99284 EMERGENCY DEPT VISIT MOD MDM: CPT | Mod: 25

## 2019-03-11 PROCEDURE — 96365 THER/PROPH/DIAG IV INF INIT: CPT

## 2019-03-11 PROCEDURE — 80053 COMPREHEN METABOLIC PANEL: CPT

## 2019-03-11 PROCEDURE — 87502 INFLUENZA DNA AMP PROBE: CPT

## 2019-03-11 PROCEDURE — 63600175 PHARM REV CODE 636 W HCPCS: Performed by: PHYSICIAN ASSISTANT

## 2019-03-11 RX ORDER — ONDANSETRON 4 MG/1
4 TABLET, ORALLY DISINTEGRATING ORAL EVERY 8 HOURS PRN
Qty: 15 TABLET | Refills: 0 | OUTPATIENT
Start: 2019-03-11 | End: 2022-10-26

## 2019-03-11 RX ORDER — ONDANSETRON 2 MG/ML
4 INJECTION INTRAMUSCULAR; INTRAVENOUS
Status: COMPLETED | OUTPATIENT
Start: 2019-03-11 | End: 2019-03-11

## 2019-03-11 RX ADMIN — SODIUM CHLORIDE 1000 ML: 0.9 INJECTION, SOLUTION INTRAVENOUS at 01:03

## 2019-03-11 RX ADMIN — PYRIDOXINE HYDROCHLORIDE 25 MG: 100 INJECTION, SOLUTION INTRAMUSCULAR; INTRAVENOUS at 03:03

## 2019-03-11 RX ADMIN — SODIUM CHLORIDE 1000 ML: 0.9 INJECTION, SOLUTION INTRAVENOUS at 03:03

## 2019-03-11 RX ADMIN — ONDANSETRON 4 MG: 2 INJECTION INTRAMUSCULAR; INTRAVENOUS at 01:03

## 2019-03-11 NOTE — ED NOTES
Pt presented to er with c/o severe n/v x 2 days. Pt stated 6 weeks pregnant. . No c/o abdominal cramping or bleeding at this time. No active vomiting noted. Family at bedside. Will continue to monitor.

## 2019-03-11 NOTE — ED PROVIDER NOTES
sEncounter Date: 3/11/2019    SCRIBE #1 NOTE: I, Monisha London, am scribing for, and in the presence of, Dr. Riley. the APC attestation.       History     Chief Complaint   Patient presents with    Emesis During Pregnancy     c/o upper resp infection-went to urgent care yesterday-was given meds-cannot list them. but cannot keep meds down due to vomiting--vomiting started 2 nights ago--did not tell urgent care about the nausea. + preganncy-reports 6weeks. GRAVA 4/PARA/A 3     34 y/o female with history of seizures,  OB history, ectopic pregnancy, presents to the ER with chief complaint of nausea and vomiting beginning 2 days ago.   She is 6 weeks pregnant confirmed by UPT.  She was seen in Urgent care yesterday for a URI, but was unable to take prescribed medications (decongestant) due to nausea and vomiting.  She had 10 episodes of vomiting over the last 2 days.   She took emetrol this morning with some improvement of her symptoms.  She has intermittent epigastric abdominal pain related to nausea and vomiting, but denies pelvic pain, vaginal discharge or bleeding.  She has diarrhea for 2 days (5 total episodes).  She has nasal congestion, cough, sneezing, body aches, chills, night sweats.  She denies fever.            Review of patient's allergies indicates:   Allergen Reactions    Penicillins Rash     PT REPORTS HAVING REACTION AS A CHILD     Past Medical History:   Diagnosis Date    Bartholin cyst     Seizures      Past Surgical History:   Procedure Laterality Date    TONSILLECTOMY      TUBAL LIGATION      left fallopian removed     No family history on file.  Social History     Tobacco Use    Smoking status: Former Smoker     Packs/day: 0.50     Types: Cigarettes    Smokeless tobacco: Never Used   Substance Use Topics    Alcohol use: Yes     Comment: OCCASIONALLY    Drug use: No     Review of Systems   Constitutional: Positive for chills and fatigue. Negative for fever.   HENT: Positive for  congestion, rhinorrhea and sneezing. Negative for sore throat.    Respiratory: Positive for cough. Negative for shortness of breath and wheezing.    Cardiovascular: Negative for chest pain.   Gastrointestinal: Positive for abdominal pain, diarrhea, nausea and vomiting.   Genitourinary: Negative for difficulty urinating, dysuria, frequency, pelvic pain, vaginal bleeding and vaginal discharge.   Musculoskeletal: Positive for myalgias. Negative for back pain.   Skin: Negative for rash.   Neurological: Negative for weakness.   Hematological: Does not bruise/bleed easily.       Physical Exam     Initial Vitals [03/11/19 1331]   BP Pulse Resp Temp SpO2   (!) 145/81 74 20 98.2 °F (36.8 °C) 98 %      MAP       --         Physical Exam    Nursing note and vitals reviewed.  Constitutional: She appears well-developed and well-nourished.   HENT:   Head: Atraumatic.   Mouth/Throat: Oropharynx is clear and moist.   Eyes: Conjunctivae and EOM are normal. Pupils are equal, round, and reactive to light.   Neck: Normal range of motion. Neck supple.   Cardiovascular: Normal rate, regular rhythm and intact distal pulses.   Pulmonary/Chest: Breath sounds normal. No respiratory distress. She has no wheezes. She has no rhonchi. She has no rales.   Abdominal: Soft. Bowel sounds are normal. She exhibits no distension. There is tenderness (mild, epigastric). There is no rebound and no guarding.   Neurological: She is alert and oriented to person, place, and time. She has normal strength.   Skin: Capillary refill takes less than 2 seconds. No rash noted.   Psychiatric: She has a normal mood and affect.         ED Course   Procedures  Labs Reviewed   URINALYSIS, REFLEX TO URINE CULTURE - Abnormal; Notable for the following components:       Result Value    Ketones, UA Trace (*)     Occult Blood UA Trace (*)     All other components within normal limits    Narrative:     Preferred Collection Type->Urine, Clean Catch   CBC W/ AUTO DIFFERENTIAL  "  COMPREHENSIVE METABOLIC PANEL          Imaging Results    None                APC / Resident Notes:   Patient presents to the ER for evaluation of nausea, vomiting, diarrhea for 3 days.  She is 6 weeks pregnant and is scheduled to see her Ob for initial OB appointment in 4 days.  Patient also has URI and was treated at an urgent care yesterday.  Her flu swab is negative today.  Her lungs are clear on exam and I do not suspect pneumonia.    Patient's labs are unremarkable. Lipase is normal, she has no evidence of severe dehydration, acute kidney injury or significant electrolyte abnormalities.  UA without evidence of infection.  She has trace ketones and is given 2 L of IV fluids in the ED.  She feels improved after receiving Zofran and B6.  She is tolerating crackers and water in the ED.  Ultrasound shows "Live twin intrauterine gestation with estimated gestational ages of 5 weeks 6 days and 6 weeks 0 days."  Patient is informed of findings and will f/u with OB this week as planned.  She is requesting prescription for Zofran at home rather than diclegis.  She will discuss continued treatment with Lexapro and trazodone with her OB.  Patient is comfortable with this plan.  She is given strict ER return precautions.  I discussed the care this patient my supervising physician.               Attending Attestation:     Physician Attestation Statement for NP/PA:   I discussed this assessment and plan of this patient with the NP/PA, but I did not personally examine the patient. The face to face encounter was performed by the NP/PA.                     Clinical Impression:       ICD-10-CM ICD-9-CM   1. Nausea/vomiting in pregnancy O21.9 643.90   2. Twin gestation in first trimester, unspecified multiple gestation type O30.001 651.03                                CARINA Brasher  03/11/19 1903    "

## 2022-10-26 ENCOUNTER — HOSPITAL ENCOUNTER (EMERGENCY)
Facility: HOSPITAL | Age: 37
Discharge: HOME | End: 2022-10-26
Attending: EMERGENCY MEDICINE
Payer: MEDICAID

## 2022-10-26 VITALS
DIASTOLIC BLOOD PRESSURE: 78 MMHG | HEART RATE: 65 BPM | SYSTOLIC BLOOD PRESSURE: 135 MMHG | WEIGHT: 190 LBS | RESPIRATION RATE: 18 BRPM | BODY MASS INDEX: 33.66 KG/M2 | HEIGHT: 63 IN | TEMPERATURE: 98 F | OXYGEN SATURATION: 97 %

## 2022-10-26 DIAGNOSIS — Z34.90 PREGNANCY, UNSPECIFIED GESTATIONAL AGE: Primary | ICD-10-CM

## 2022-10-26 LAB
B-HCG UR QL: POSITIVE
BILIRUB UR QL STRIP: NEGATIVE
CLARITY UR: CLEAR
COLOR UR: YELLOW
CTP QC/QA: YES
GLUCOSE UR QL STRIP: NEGATIVE
HGB UR QL STRIP: NEGATIVE
KETONES UR QL STRIP: NEGATIVE
LEUKOCYTE ESTERASE UR QL STRIP: NEGATIVE
NITRITE UR QL STRIP: NEGATIVE
PH UR STRIP: 7 [PH] (ref 5–8)
PROT UR QL STRIP: NEGATIVE
SP GR UR STRIP: 1.02 (ref 1–1.03)
URN SPEC COLLECT METH UR: NORMAL
UROBILINOGEN UR STRIP-ACNC: NEGATIVE EU/DL

## 2022-10-26 PROCEDURE — 99283 EMERGENCY DEPT VISIT LOW MDM: CPT | Mod: 25

## 2022-10-26 PROCEDURE — 81003 URINALYSIS AUTO W/O SCOPE: CPT | Performed by: NURSE PRACTITIONER

## 2022-10-26 PROCEDURE — 81025 URINE PREGNANCY TEST: CPT | Performed by: NURSE PRACTITIONER

## 2022-10-26 PROCEDURE — 25000003 PHARM REV CODE 250: Performed by: NURSE PRACTITIONER

## 2022-10-26 RX ORDER — ONDANSETRON 4 MG/1
4 TABLET, ORALLY DISINTEGRATING ORAL EVERY 8 HOURS PRN
Qty: 9 TABLET | Refills: 0 | Status: SHIPPED | OUTPATIENT
Start: 2022-10-26 | End: 2022-10-29

## 2022-10-26 RX ORDER — ONDANSETRON 4 MG/1
4 TABLET, ORALLY DISINTEGRATING ORAL
Status: COMPLETED | OUTPATIENT
Start: 2022-10-26 | End: 2022-10-26

## 2022-10-26 RX ADMIN — ONDANSETRON 4 MG: 4 TABLET, ORALLY DISINTEGRATING ORAL at 11:10

## 2022-10-26 NOTE — DISCHARGE INSTRUCTIONS

## 2022-10-26 NOTE — ED PROVIDER NOTES
Encounter Date: 10/26/2022    SCRIBE #1 NOTE: I, Saleem Berger, am scribing for, and in the presence of,  Patrica Frank NP. I have scribed the following portions of the note - Other sections scribed: HPI, ROS, Physical Exam.     History     Chief Complaint   Patient presents with    Nausea     Pt c/o fatigue and nausea x 1 week. Pt recently weaned off suboxone. Last dose 2 weeks ago. Pt also reports her period is about 1 month late.      Rosalind Willis is a 37 y.o. female who presents to the ED with worsening nausea x1 week. She also complains of fatigue and white vaginal discharge. She denies dysuria, vaginal bleeding, or abdominal pain. Patient reports she stopped taking Suboxone approximately 2 to 3 weeks ago. Patient's LMP was a month ago.    The history is provided by the patient. No  was used.   Review of patient's allergies indicates:   Allergen Reactions    Penicillins Rash     PT REPORTS HAVING REACTION AS A CHILD     Past Medical History:   Diagnosis Date    Bartholin cyst     Seizures      Past Surgical History:   Procedure Laterality Date    TONSILLECTOMY      TUBAL LIGATION      left fallopian removed     No family history on file.  Social History     Tobacco Use    Smoking status: Former     Packs/day: 0.50     Types: Cigarettes    Smokeless tobacco: Never   Substance Use Topics    Alcohol use: Yes     Comment: OCCASIONALLY    Drug use: No     Review of Systems   Constitutional:  Positive for fatigue. Negative for chills and fever.   HENT:  Negative for ear pain and sore throat.    Eyes:  Negative for redness.   Respiratory:  Negative for shortness of breath.    Cardiovascular:  Negative for chest pain.   Gastrointestinal:  Positive for nausea. Negative for abdominal pain, diarrhea and vomiting.   Genitourinary:  Positive for vaginal discharge. Negative for dysuria and vaginal bleeding.   Musculoskeletal:  Negative for back pain.   Skin:  Negative for rash.   Neurological:   Negative for headaches.     Physical Exam     Initial Vitals [10/26/22 1100]   BP Pulse Resp Temp SpO2   (!) 152/84 72 18 98.3 °F (36.8 °C) 97 %      MAP       --         Physical Exam    Nursing note and vitals reviewed.  Constitutional: She appears well-developed and well-nourished.   HENT:   Head: Normocephalic and atraumatic.   Mouth/Throat: Oropharynx is clear and moist.   Eyes: Conjunctivae and EOM are normal. Pupils are equal, round, and reactive to light.   Neck: Neck supple.   Normal range of motion.  Cardiovascular:  Normal rate, regular rhythm, normal heart sounds and intact distal pulses.     Exam reveals no gallop and no friction rub.       No murmur heard.  Pulmonary/Chest: Breath sounds normal.   Abdominal: Abdomen is soft. Bowel sounds are normal. She exhibits no distension. There is no abdominal tenderness. There is no rebound and no guarding.   Musculoskeletal:         General: No tenderness or edema. Normal range of motion.      Cervical back: Normal range of motion and neck supple.     Lymphadenopathy:     She has no cervical adenopathy.   Neurological: She is alert and oriented to person, place, and time. She has normal strength and normal reflexes.   Skin: Skin is warm and dry.   Psychiatric: She has a normal mood and affect. Her behavior is normal. Judgment and thought content normal.       ED Course   Procedures  Labs Reviewed   POCT URINE PREGNANCY - Abnormal; Notable for the following components:       Result Value    POC Preg Test, Ur Positive (*)     All other components within normal limits   URINALYSIS, REFLEX TO URINE CULTURE    Narrative:     Specimen Source->Urine          Imaging Results    None          Medications   ondansetron disintegrating tablet 4 mg (4 mg Oral Given 10/26/22 1136)     Medical Decision Making:   Initial Assessment:   Rosalind Willis is a 37 y.o. female who presents to the ED with worsening nausea x1 week.  Clinical Tests:   Lab Tests: Ordered and Reviewed         Scribe Attestation:   Scribe #1: I performed the above scribed service and the documentation accurately describes the services I performed. I attest to the accuracy of the note.      ED Course as of 10/26/22 1203   Wed Oct 26, 2022   1201 Patient's repeat pressure was 135/78.  The patient was requesting a prescription for Zofran as she states that would help with her symptoms and prior pregnancies.  She will call her OBGYN that is located at [facility] today to get a new patient appointment.  The patient was not having any vaginal bleeding nor abdominal discomfort.  Her symptoms associated with the nausea and fatigue are associated with + pregnancy identified here today.  Strict return precautions have been given. Stable for dc  [DT]      ED Course User Index  [DT] Patrica Frank NP                 Clinical Impression:   Final diagnoses:  [Z34.90] Pregnancy, unspecified gestational age (Primary)      ED Disposition Condition    Discharge Stable              I, Patrica Frank NP, personally performed the services described in this documentation.All medical record entries made by the scribe were at my direction and in my presence.I have reviewed the chart and agree that the record reflects my personal performance and is accurate and complete.   ED Prescriptions       Medication Sig Dispense Start Date End Date Auth. Provider    ondansetron (ZOFRAN-ODT) 4 MG TbDL Take 1 tablet (4 mg total) by mouth every 8 (eight) hours as needed (vomiting). 9 tablet 10/26/2022 10/29/2022 Patrica Frank NP          Follow-up Information       Follow up With Specialties Details Why Contact Info    Mariah Burt MD Obstetrics and Gynecology Schedule an appointment as soon as possible for a visit in 2 days  [address]  [place] [address]  [phone]               Patrica Frank NP  10/26/22 1203

## 2022-10-26 NOTE — ED NOTES
LMP one month ago, no abdominal pain, no vaginal bleeding, +white discharge vaginally but no itching. +nauseated and vomiting x 1 week with fatigue. +pregnancy unexpected on ED visit. Patient informed per provider. UA sent analysis      APPEARANCE: Alert, oriented and in no acute distress.  HEENT: Speaks without hoarseness.  CARDIAC: Normal rate and rhythm.    PERIPHERAL VASCULAR: peripheral pulses present. Normal cap refill. No edema. Warm to touch.    RESPIRATORY:Normal rate and effort. Respirations are equal and unlabored no obvious signs of distress.  GASTRO: soft, nondistended, nontender. +nausea and vomiting x 1 week  : voids spontaneously and without difficulty.   MUSC: Full ROM. No obvious deformity. Ambulatory with a steady gait  SKIN: Skin is warm and dry, without discoloration. Mucous membranes moist.  NEURO: Pt is awake, alert, aware of environment. No neurologic deficits noted.

## 2023-04-12 ENCOUNTER — TELEPHONE (OUTPATIENT)
Dept: GASTROENTEROLOGY | Facility: CLINIC | Age: 38
End: 2023-04-12
Payer: MEDICAID

## 2023-04-12 NOTE — TELEPHONE ENCOUNTER
Pt requesting new pt appt from recommendations of OB.  Pt is 31 weeks pregnant.  Pt advised to have OB send referral to clinic at [phone].